# Patient Record
(demographics unavailable — no encounter records)

---

## 2024-10-07 NOTE — HISTORY OF PRESENT ILLNESS
[FreeTextEntry6] : rash on face -  mom noticed it this morning per oswaldo she noticed it a little bit last night was at a party yesterday, playing outside a lot, there was a cat no other symptoms no fevers  otherwise well

## 2024-12-10 NOTE — ASSESSMENT
[FreeTextEntry1] : Ja is a 14 yo girl returning for follow-up of poor weight gain and growth.  Today, Ja is a well-appearing girl who is at the % for height, % for weight, and % for BMI. Growth velocity since last visit of cm/yr. She has gained cm and  lb.

## 2024-12-13 NOTE — END OF VISIT
[FreeTextEntry3] : Ja is a 13y 3m female with short stature and a good height velocity. She has history of poor weight gain and has in fact lost weight since last visit and her BMI continues to decline. She denies restricting food.   Plan - Restart cyproheptadine.  - Referred again to GI.  - Increase caloric intake.  - Follow up in 6 months.   Xiao Bernardo MD Pediatric Endocrinology [address], [address] [place] [address] [phone]

## 2024-12-13 NOTE — HISTORY OF PRESENT ILLNESS
[Premenarchal] : premenarchal [FreeTextEntry2] : Ja is a 13 yr 3 mo old girl returning for follow-up of growth and weight concerns.  She was first seen by Dr. Bernardo in February and last seen in May. Initial growth workup in February unrevealing. Bone age at CA 12 yr 5 mo read as 12 yr 9 mo. At her last visit, she had grown 1.2 cm and gained 9 lbs with a height velocity of 4.8 cm/yr. She was continuing to take cyproheptadine for appetite stimulation. Nutrition evaluation was again recommended but not undertaken. Mother's height 59 inches Father's height 70 inches Mid-parental sex-adjusted target height 62 inches.  In the interim, Ja has been well. She has no headaches, vision issues, constipation, diarrhea, fatigue, sleeping difficulties. Ja says she has been working on eating more, which dad says is successful at home. She stopped taking cyproheptadine over the summer because they ran out of medication.   Since last visit, Ja has grown 2.7 cm and has lost one pound. Height velocity 4.38 cm/yr.

## 2024-12-13 NOTE — PHYSICAL EXAM
[Healthy Appearing] : healthy appearing [Well Nourished] : well nourished [Interactive] : interactive [Normal Appearance] : normal appearance [Well formed] : well formed [Normally Set] : normally set [Normal S1 and S2] : normal S1 and S2 [Clear to Ausculation Bilaterally] : clear to auscultation bilaterally [Abdomen Soft] : soft [Abdomen Tenderness] : non-tender [] : no hepatosplenomegaly [Normal] : normal  [3] : was Abner stage 3 [Abner Stage ___] : the Abner stage for breast development was [unfilled] [Murmur] : no murmurs

## 2024-12-13 NOTE — END OF VISIT
[FreeTextEntry3] : Ja is a 13y 3m female with short stature and a good height velocity. She has history of poor weight gain and has in fact lost weight since last visit and her BMI continues to decline. She denies restricting food.   Plan - Restart cyproheptadine.  - Referred again to GI.  - Increase caloric intake.  - Follow up in 6 months.   Xiao Bernardo MD Pediatric Endocrinology [address], [address] [place] [address] [phone]

## 2024-12-13 NOTE — CONSULT LETTER
[Dear  ___] : Dear  [unfilled], [Courtesy Letter:] : I had the pleasure of seeing your patient, [unfilled], in my office today. [Please see my note below.] : Please see my note below. [Consult Closing:] : Thank you very much for allowing me to participate in the care of this patient.  If you have any questions, please do not hesitate to contact me. [Sincerely,] : Sincerely, [FreeTextEntry3] : Xiao Bernardo MD Pediatric Endocrinologist

## 2025-04-25 NOTE — HISTORY OF PRESENT ILLNESS
[FreeTextEntry1] : 12yo here for well visit hx poor weight gain - has been seen by GI in the past, most recently endocrinology with normal bone age and initial labs.  trial cyproheptadine with initial improvement   eats well, back on cyproheptadine, taking pediasure doesn't eat much at school normal urine/BM sleeps well  8th grade, going well  likes science and [employer]  active, plays volleyball hasn't started started menses yet  HEADSS neg some trouble with friends this year but has good healthy relationships. not SA

## 2025-04-25 NOTE — PHYSICAL EXAM
[Alert] : alert [No Acute Distress] : no acute distress [Normocephalic] : normocephalic [EOMI Bilateral] : EOMI bilateral [Clear tympanic membranes with bony landmarks and light reflex present bilaterally] : clear tympanic membranes with bony landmarks and light reflex present bilaterally  [Pink Nasal Mucosa] : pink nasal mucosa [Nonerythematous Oropharynx] : nonerythematous oropharynx [Supple, full passive range of motion] : supple, full passive range of motion [No Palpable Masses] : no palpable masses [Clear to Auscultation Bilaterally] : clear to auscultation bilaterally [Regular Rate and Rhythm] : regular rate and rhythm [Normal S1, S2 audible] : normal S1, S2 audible [No Murmurs] : no murmurs [+2 Femoral Pulses] : +2 femoral pulses [Soft] : soft [NonTender] : non tender [Non Distended] : non distended [Normoactive Bowel Sounds] : normoactive bowel sounds [No Hepatomegaly] : no hepatomegaly [No Splenomegaly] : no splenomegaly [Abner: ____] : Abner [unfilled] [Abner: _____] : Abner [unfilled] [No Abnormal Lymph Nodes Palpated] : no abnormal lymph nodes palpated [Normal Muscle Tone] : normal muscle tone [No Gait Asymmetry] : no gait asymmetry [No pain or deformities with palpation of bone, muscles, joints] : no pain or deformities with palpation of bone, muscles, joints [Straight] : straight [+2 Patella DTR] : +2 patella DTR [Cranial Nerves Grossly Intact] : cranial nerves grossly intact [de-identified] : acne on face - mix of comedonal and non-comedonal

## 2025-04-25 NOTE — RISK ASSESSMENT
[3] : 1) Little interest or pleasure doing things for nearly every day (3) [1] : 2) Feeling down, depressed, or hopeless for several days (1) [PHQ-2 Positive] : PHQ-2 Positive [PHQ-9 Positive] : PHQ-9 Positive [I have developed a follow-up plan documented below in the note.] : I have developed a follow-up plan documented below in the note. [[id number]] : 4

## 2025-04-25 NOTE — DISCUSSION/SUMMARY
[Normal Growth] : growth [Normal Development] : development  [No Elimination Concerns] : elimination [Continue Regimen] : feeding [Normal Sleep Pattern] : sleep [None] : no medical problems [Anticipatory Guidance Given] : Anticipatory guidance addressed as per the history of present illness section [Physical Growth and Development] : physical growth and development [Social and Academic Competence] : social and academic competence [Emotional Well-Being] : emotional well-being [Risk Reduction] : risk reduction [Violence and Injury Prevention] : violence and injury prevention [No Vaccines] : no vaccines needed [No Medications] : ~He/She~ is not on any medications [Patient] : patient [Parent/Guardian] : Parent/Guardian [Full Activity without restrictions including Physical Education & Athletics] : Full Activity without restrictions including Physical Education & Athletics [I have examined the above-named student and completed the preparticipation physical evaluation. The athlete does not present apparent clinical contraindications to practice and participate in sport(s) as outlined above. A copy of the physical exam is on r] : I have examined the above-named student and completed the preparticipation physical evaluation. The athlete does not present apparent clinical contraindications to practice and participate in sport(s) as outlined above. A copy of the physical exam is on record in my office and can be made available to the school at the request of the parents. If conditions arise after the athlete has been cleared for participation, the physician may rescind the clearance until the problem is resolved and the potential consequences are completely explained to the athlete (and parents/guardians). [FreeTextEntry1] : 12yo here for well visit growing well this past year, gaining weight seeing endo next month discussed nutrition and development, discussed expectation for menses  discussed problems with friends, feeling better lately acne - skin care discussed, BP gel and differin hpv vaccine discussed, parent declined  RTO in 1 year for well visit or sooner PRN

## 2025-06-13 NOTE — HISTORY OF PRESENT ILLNESS
[Premenarchal] : premenarchal [FreeTextEntry2] : Shantell is a 13y 9m female here for follow up of concern about growth and poor weight gain.  Shantell was initially evaluated by me on 2/1/2024. Initial blood work was unremarkable. The IGF-1 was 316 ng/mL. A trial of cyproheptadine was started to enhance Shantell's weight gain. Initial bone age was 12 9/12 years at 12 5/12 years of age.   Shantell returns today for continued monitoring of her growth. Mother reports that SHANTELL has been outgrowing clothes and shoes (size 4-5). Shantell reports intermittent headaches that resolve with time and rest. She denies vision changes and abdominal pains. Mother reports that Shantell has been doing well with cyproheptadine and is having a large increase in her appetite. Mother reports that the medication ran out one month ago. Shantell remains premenarchal. She has started a dermatologic cream for Shantell's acne.    Since last visit, SHANTELL has grown 3.6 cm and gained 8 lbs. Height velocity 7.2 cm. Mother's height 59 inches Father's height 70 inches Mid-parental sex-adjusted target height 62 inches.

## 2025-06-13 NOTE — PHYSICAL EXAM
[Healthy Appearing] : healthy appearing [Well Nourished] : well nourished [Interactive] : interactive [Normal Appearance] : normal appearance [Well formed] : well formed [Normally Set] : normally set [Normal S1 and S2] : normal S1 and S2 [Clear to Ausculation Bilaterally] : clear to auscultation bilaterally [Abdomen Soft] : soft [Abdomen Tenderness] : non-tender [] : no hepatosplenomegaly [4] : was Abner stage 4 [Scant] : scant [Abner Stage ___] : the Abner stage for breast development was [unfilled] [Normal] : normal  [Murmur] : no murmurs [Scoliosis] : scoliosis not appreciated

## 2025-06-13 NOTE — ASSESSMENT
[FreeTextEntry1] : Ja is a 13y 9m female with concern about growth and history of poor weight gain. Since starting cyproheptadine therapy, she has had an increased appetite and has gained weight and grown in height. She now has a very good height velocity.   Plan - Restart cyproheptadine 4 mg daily.  - Continue to increase caloric intake to provide adequate nutrients for growth.  - Continue to monitor growth and development.  - Follow up in 6 months.   Xiao Bernardo MD Pediatric Endocrinology [address], [address] [place] [address] [phone]

## 2025-06-13 NOTE — CONSULT LETTER
[Dear  ___] : Dear  [unfilled], [Courtesy Letter:] : I had the pleasure of seeing your patient, [unfilled], in my office today. [Please see my note below.] : Please see my note below. [Consult Closing:] : Thank you very much for allowing me to participate in the care of this patient.  If you have any questions, please do not hesitate to contact me. [Sincerely,] : Sincerely, [FreeTextEntry3] : Xiao Bernardo MD Pediatric Endocrinologist  No